# Patient Record
Sex: MALE | Race: WHITE | NOT HISPANIC OR LATINO | URBAN - METROPOLITAN AREA
[De-identification: names, ages, dates, MRNs, and addresses within clinical notes are randomized per-mention and may not be internally consistent; named-entity substitution may affect disease eponyms.]

---

## 2023-05-19 PROBLEM — M17.0 PRIMARY OSTEOARTHRITIS OF BOTH KNEES: Status: ACTIVE | Noted: 2023-05-19

## 2023-05-19 PROBLEM — E66.813 CLASS 3 SEVERE OBESITY WITH BODY MASS INDEX (BMI) OF 45.0 TO 49.9 IN ADULT: Status: ACTIVE | Noted: 2023-05-19

## 2023-05-19 PROBLEM — M1A.9XX0 CHRONIC GOUT INVOLVING TOE OF RIGHT FOOT WITHOUT TOPHUS: Status: ACTIVE | Noted: 2023-05-19

## 2023-05-19 PROBLEM — E66.01 CLASS 3 SEVERE OBESITY WITH BODY MASS INDEX (BMI) OF 45.0 TO 49.9 IN ADULT: Status: ACTIVE | Noted: 2023-05-19

## 2023-06-27 PROBLEM — U07.1 DISEASE DUE TO SEVERE ACUTE RESPIRATORY SYNDROME CORONAVIRUS 2 (SARS-COV-2): Status: ACTIVE | Noted: 2020-05-07

## 2024-10-21 ENCOUNTER — TELEPHONE (OUTPATIENT)
Dept: INFECTIOUS DISEASES | Facility: CLINIC | Age: 44
End: 2024-10-21
Payer: COMMERCIAL

## 2024-10-22 NOTE — TELEPHONE ENCOUNTER
----- Message from Jacqui sent at 10/21/2024  8:10 AM CDT -----  Contact: Pt Wife Jr Palacio  Type: Needs Medical Advice         Who Called: Pt Carrington Palacio     Best Call Back Number:[phone]     Additional Information: Requesting a call back regarding  Pt has ongoing infection in right leg. Pt was admitted in [facility]  09/26 and was in for 4 days. Pt was given IV antibiotics and and discharged. Pt then went to PCP on 10/01 and was given another round of antibiotics. Pt contacted pcp on 10/03 due to to it not getting better and was again given antibiotics and pt is currently taking but leg is not getting better and has scabbing w/ dark purple that fades and comes back. Pt wife is asking for the office to call her. Wife wanting pt to be seen by Dr Silver davis.     Please Advise- Thank you  
Per sang Conn to schedule 1-2 weeks  Called pt's wife, scheduled NP referral for cellulitis on 10/31/24 at 10 am.  
Returned call to pt's wife, discussed spouse's ongoing leg infection (see previous message), request to see Dr. Sheppard. Discussed referral process. Wife will call PCP Dr. Coker to have referral and records faxed asap for review by Dr. Sheppard.  
Patient

## 2024-10-31 ENCOUNTER — OFFICE VISIT (OUTPATIENT)
Dept: INFECTIOUS DISEASES | Facility: CLINIC | Age: 44
End: 2024-10-31
Payer: COMMERCIAL

## 2024-10-31 VITALS
RESPIRATION RATE: 14 BRPM | HEIGHT: 75 IN | BODY MASS INDEX: 35.8 KG/M2 | SYSTOLIC BLOOD PRESSURE: 115 MMHG | HEART RATE: 65 BPM | DIASTOLIC BLOOD PRESSURE: 66 MMHG | TEMPERATURE: 99 F | WEIGHT: 287.94 LBS

## 2024-10-31 DIAGNOSIS — L03.115 CELLULITIS OF RIGHT LOWER EXTREMITY: Primary | ICD-10-CM

## 2024-10-31 DIAGNOSIS — I77.6 VASCULITIS: ICD-10-CM

## 2024-10-31 PROCEDURE — 3078F DIAST BP <80 MM HG: CPT | Mod: CPTII,S$GLB,, | Performed by: INTERNAL MEDICINE

## 2024-10-31 PROCEDURE — 99203 OFFICE O/P NEW LOW 30 MIN: CPT | Mod: S$GLB,,, | Performed by: INTERNAL MEDICINE

## 2024-10-31 PROCEDURE — 3044F HG A1C LEVEL LT 7.0%: CPT | Mod: CPTII,S$GLB,, | Performed by: INTERNAL MEDICINE

## 2024-10-31 PROCEDURE — 1159F MED LIST DOCD IN RCRD: CPT | Mod: CPTII,S$GLB,, | Performed by: INTERNAL MEDICINE

## 2024-10-31 PROCEDURE — 3008F BODY MASS INDEX DOCD: CPT | Mod: CPTII,S$GLB,, | Performed by: INTERNAL MEDICINE

## 2024-10-31 PROCEDURE — 3074F SYST BP LT 130 MM HG: CPT | Mod: CPTII,S$GLB,, | Performed by: INTERNAL MEDICINE

## 2024-10-31 PROCEDURE — 99999 PR PBB SHADOW E&M-EST. PATIENT-LVL IV: CPT | Mod: PBBFAC,,, | Performed by: INTERNAL MEDICINE

## 2024-10-31 RX ORDER — LINEZOLID 600 MG/1
600 TABLET, FILM COATED ORAL EVERY 12 HOURS
Qty: 28 TABLET | Refills: 0 | Status: SHIPPED | OUTPATIENT
Start: 2024-10-31 | End: 2024-11-14

## 2024-10-31 NOTE — PROGRESS NOTES
Patient ID: Diony Medina is a 44 y.o. male.    Subjective:         Chief Complaint: Cellulitis (R leg )    HPI    5 weeks prior presented with malaise, feeling ill  Febrile, COVID? Negative. Fatigue  Next day leg redness   Seen in [facility] and received Vanc and Zosyn for 4 days then DC on Augmentin for 7 days  Unfortunately redness worsened on PO augmentin reason for which patient was referred to this clinic for evaluation  Denies fevers, chills or night sweats      Past Medical History:   Diagnosis Date    Acute idiopathic gout of right foot 02/26/2021    ADHD (attention deficit hyperactivity disorder), inattentive type 02/26/2021    Obesity 02/26/2021       No past surgical history on file.    Review of patient's allergies indicates:  No Known Allergies    No family history on file.    Social History     Socioeconomic History    Marital status:    Tobacco Use    Smoking status: Former     Types: Cigarettes    Smokeless tobacco: Never   Substance and Sexual Activity    Alcohol use: Yes     Comment: Socially    Drug use: Never    Sexual activity: Yes     Partners: Female       Current Outpatient Medications   Medication Instructions    amoxicillin-clavulanate 500-125mg (AUGMENTIN) 500-125 mg Tab 1 tablet, 2 times daily    colchicine (COLCRYS) 0.6 mg, Oral, 2 times daily PRN    gabapentin (NEURONTIN) 600 mg, Oral, 3 times daily    ibuprofen (ADVIL,MOTRIN) 800 MG tablet TAKE 1 TABLET BY MOUTH THREE TIMES DAILY AS NEEDED    linezolid (ZYVOX) 600 mg, Oral, Every 12 hours    MOUNJARO 5 mg, Every 7 days    traMADoL (ULTRAM) 50 mg, Oral, 3 times daily PRN         Review of Systems   Constitutional:  Negative for activity change, chills, diaphoresis, fatigue, fever and unexpected weight change.   HENT:  Negative for sore throat.    Eyes:  Negative for photophobia and visual disturbance.   Respiratory:  Negative for cough and shortness of breath.    Cardiovascular:  Negative for chest pain and leg swelling.    Gastrointestinal:  Negative for abdominal distention, abdominal pain, nausea and vomiting.   Genitourinary:  Negative for difficulty urinating, dysuria and flank pain.   Musculoskeletal:  Negative for arthralgias.   Skin:  Positive for color change. Negative for rash.   Allergic/Immunologic: Negative for immunocompromised state.   Neurological:  Negative for dizziness and headaches.   Psychiatric/Behavioral:  The patient is not nervous/anxious.            Objective:     Vitals:    10/31/24 0948   BP: 115/66   Pulse: 65   Resp: 14   Temp: 98.6 °F (37 °C)       Body mass index is 35.99 kg/m².         Physical Exam  Vitals reviewed.   Constitutional:       General: He is not in acute distress.     Appearance: Normal appearance. He is well-developed. He is not ill-appearing.   HENT:      Head: Normocephalic and atraumatic.      Right Ear: External ear normal.      Left Ear: External ear normal.      Nose: Nose normal.   Eyes:      General: No scleral icterus.        Right eye: No discharge.         Left eye: No discharge.      Pupils: Pupils are equal, round, and reactive to light.   Cardiovascular:      Rate and Rhythm: Normal rate and regular rhythm.      Heart sounds: Normal heart sounds. No murmur heard.  Pulmonary:      Effort: Pulmonary effort is normal. No respiratory distress.      Breath sounds: Normal breath sounds. No wheezing.   Abdominal:      General: There is no distension.      Palpations: Abdomen is soft.      Tenderness: There is no abdominal tenderness.   Musculoskeletal:         General: Normal range of motion.      Cervical back: Normal range of motion and neck supple. No rigidity.      Comments: Swelling, erythema   Lymphadenopathy:      Cervical: No cervical adenopathy.   Skin:     General: Skin is warm and dry.      Coloration: Skin is not jaundiced.   Neurological:      Mental Status: He is alert and oriented to person, place, and time.   Psychiatric:         Mood and Affect: Mood normal.          Speech: Speech normal.         Behavior: Behavior normal.         Judgment: Judgment normal.           Assessment:         Diony was seen today for cellulitis.    Diagnoses and all orders for this visit:    Cellulitis of right lower extremity  -     Ambulatory referral/consult to Infectious Disease  -     linezolid (ZYVOX) 600 mg Tab; Take 1 tablet (600 mg total) by mouth every 12 (twelve) hours. for 14 days    Vasculitis  -     Ambulatory referral/consult to Infectious Disease      Plan:     - Seems that he improved while on Vanc and Zosyn but worsened while on Augmentin  - Will start Linezolid  - patient does have evidence of lymphedema which is bilateral.  Recommended leg elevation and compression stockings.    - Also recommended lymphedema clinic.    - advised patient to communicate with this clinic if redness did not improve.  - all questions answered     Greater than 30 minutes was spent on this encounter, which included: review of recent encounters, review and interpretation of labs/images, obtaining pertinent history, performing a physical examination, counseling and educating the patient/family/caregiver, ordering medications/tests, documenting in the electronic health record, and coordinating care with necessary providers.    Reg Sheppard MD  Infectious Diseases

## 2024-11-13 ENCOUNTER — PATIENT MESSAGE (OUTPATIENT)
Dept: INFECTIOUS DISEASES | Facility: CLINIC | Age: 44
End: 2024-11-13
Payer: COMMERCIAL

## 2024-11-15 ENCOUNTER — TELEPHONE (OUTPATIENT)
Dept: INFECTIOUS DISEASES | Facility: CLINIC | Age: 44
End: 2024-11-15

## 2024-11-15 NOTE — TELEPHONE ENCOUNTER
Called pt's wife, informed 14 additional day of Linezolid, she has picked up and pt has continued. Will repeat cbc next week at [facility].    Pt has not been to Lymphedema Clinic, no referral.   Routed message to Dr. Sheppard

## 2024-11-22 ENCOUNTER — PATIENT MESSAGE (OUTPATIENT)
Dept: INFECTIOUS DISEASES | Facility: CLINIC | Age: 44
End: 2024-11-22
Payer: COMMERCIAL

## 2024-11-22 ENCOUNTER — TELEPHONE (OUTPATIENT)
Dept: INFECTIOUS DISEASES | Facility: CLINIC | Age: 44
End: 2024-11-22
Payer: COMMERCIAL

## 2024-11-22 NOTE — TELEPHONE ENCOUNTER
Call placed to patient, no answer.    Call placed to spouse   Unable to speak with her.    Noted mild thrombocytopenia and anemia likely related to linezolid.    It appears that patient is better so I have advised to stop linezolid and follow with lymphedema Clinic   All questions answered follow with Infectious Diseases as needed

## 2025-04-22 ENCOUNTER — OFFICE VISIT (OUTPATIENT)
Dept: FAMILY MEDICINE | Facility: CLINIC | Age: 45
End: 2025-04-22
Payer: COMMERCIAL

## 2025-04-22 VITALS
HEART RATE: 81 BPM | SYSTOLIC BLOOD PRESSURE: 110 MMHG | DIASTOLIC BLOOD PRESSURE: 76 MMHG | BODY MASS INDEX: 35.64 KG/M2 | OXYGEN SATURATION: 97 % | HEIGHT: 75 IN | WEIGHT: 286.63 LBS

## 2025-04-22 DIAGNOSIS — M1A.09X0 IDIOPATHIC CHRONIC GOUT OF MULTIPLE SITES WITHOUT TOPHUS: ICD-10-CM

## 2025-04-22 DIAGNOSIS — E66.09 CLASS 2 OBESITY DUE TO EXCESS CALORIES WITHOUT SERIOUS COMORBIDITY WITH BODY MASS INDEX (BMI) OF 35.0 TO 35.9 IN ADULT: Primary | ICD-10-CM

## 2025-04-22 DIAGNOSIS — E66.812 CLASS 2 OBESITY DUE TO EXCESS CALORIES WITHOUT SERIOUS COMORBIDITY WITH BODY MASS INDEX (BMI) OF 35.0 TO 35.9 IN ADULT: Primary | ICD-10-CM

## 2025-04-22 DIAGNOSIS — M17.0 PRIMARY OSTEOARTHRITIS OF BOTH KNEES: ICD-10-CM

## 2025-04-22 PROBLEM — U07.1 DISEASE DUE TO SEVERE ACUTE RESPIRATORY SYNDROME CORONAVIRUS 2 (SARS-COV-2): Status: RESOLVED | Noted: 2020-05-07 | Resolved: 2025-04-22

## 2025-04-22 PROCEDURE — 99999 PR PBB SHADOW E&M-EST. PATIENT-LVL III: CPT | Mod: PBBFAC,,, | Performed by: FAMILY MEDICINE

## 2025-04-22 RX ORDER — INDOMETHACIN 50 MG/1
CAPSULE ORAL
COMMUNITY
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
## 2025-04-29 ENCOUNTER — PATIENT MESSAGE (OUTPATIENT)
Dept: FAMILY MEDICINE | Facility: CLINIC | Age: 45
End: 2025-04-29
Payer: COMMERCIAL

## 2025-04-29 DIAGNOSIS — M1A.9XX0 CHRONIC GOUT INVOLVING TOE OF RIGHT FOOT WITHOUT TOPHUS, UNSPECIFIED CAUSE: ICD-10-CM

## 2025-04-30 NOTE — TELEPHONE ENCOUNTER
Care Due:                  Date            Visit Type   Department     Provider  --------------------------------------------------------------------------------                                NP -                              PRIMARY      [facility] FAMILY  Last Visit: 04-      CARE ([facility])   GILBERTO Bobo  Next Visit: None Scheduled  None         None Found                                                            Last  Test          Frequency    Reason                     Performed    Due Date  --------------------------------------------------------------------------------    HBA1C.......  6 months...  tirzepatide,.............  08- 02-    [facility] Embedded Care Due Messages. Reference number: [id number].   4/30/2025 9:10:41 AM CDT

## 2025-05-02 ENCOUNTER — TELEPHONE (OUTPATIENT)
Dept: FAMILY MEDICINE | Facility: CLINIC | Age: 45
End: 2025-05-02
Payer: COMMERCIAL

## 2025-05-02 RX ORDER — TRAMADOL HYDROCHLORIDE 50 MG/1
50 TABLET ORAL 3 TIMES DAILY PRN
Qty: 90 TABLET | Refills: 5 | Status: SHIPPED | OUTPATIENT
Start: 2025-05-02

## 2025-05-02 RX ORDER — GABAPENTIN 600 MG/1
600 TABLET ORAL 3 TIMES DAILY
Qty: 90 TABLET | Refills: 11 | Status: SHIPPED | OUTPATIENT
Start: 2025-05-02 | End: 2026-05-02

## 2025-05-02 RX ORDER — IBUPROFEN 800 MG/1
TABLET ORAL
Qty: 90 TABLET | Refills: 1 | Status: SHIPPED | OUTPATIENT
Start: 2025-05-02

## 2025-05-02 NOTE — TELEPHONE ENCOUNTER
----- Message from Maya sent at 5/2/2025 11:30 AM CDT -----  Contact: pt  Type:  Needs Medical AdviceWho Called: Nasra name and phone #:  [id number] - [place], [place] - [address] [facility] [address] [id number] [facility] [address] [address]: [phone] Fax: [phone] the patient rather a call back or a response via [facility]? Call back Best Call Back Number: .[phone] Information: tramadol HCl 50 mg Oral 3 times daily PRNProtocol Detailsibuprofen TAKE 1 TABLET BY MOUTH THREE TIMES DAILY AS NEEDEDProtocol Detailsgabapentin 600 mg Oral 3 times daily Pt just wants to know these medications are being filled by  and not dr sandhu that is no loger is

## 2025-05-02 NOTE — TELEPHONE ENCOUNTER
Patient is calling to clarify that you will be prescribing these mediations from now on. Please advise.

## 2025-05-04 ENCOUNTER — TELEPHONE (OUTPATIENT)
Dept: FAMILY MEDICINE | Facility: CLINIC | Age: 45
End: 2025-05-04
Payer: COMMERCIAL

## 2025-05-04 DIAGNOSIS — M1A.9XX0 CHRONIC GOUT INVOLVING TOE OF RIGHT FOOT WITHOUT TOPHUS, UNSPECIFIED CAUSE: ICD-10-CM

## 2025-05-05 NOTE — TELEPHONE ENCOUNTER
Copied from CRM #[id number]. Topic: Medications - Medication Refill  >> May 1, 2025  8:18 AM Meron wrote:  Type:  RX Refill Request    Who Called:  Pt/wife  Refill or New Rx: refill  RX Name and Strength: Start End   gabapentin (NEURONTIN) 600 MG tablet 90 tablet 11 4/11/2024 4/22/2025   Sig - Route: Take 1 tablet (600 mg total) by mouth 3 (three) times daily. - Oral   Sent to pharmacy as: gabapentin (NEURONTIN) 600 MG tablet    Disp Refills Start End   traMADoL (ULTRAM) 50 mg tablet 90 tablet 5 7/31/2024 -   Sig - Route: Take 1 tablet (50 mg total) by mouth 3 (three) times daily as needed for Pain. - Oral   Sent to pharmacy as: traMADoL (ULTRAM) 50 mg tablet      Disp Refills Start End   ibuprofen (ADVIL,MOTRIN) 800 MG tablet 90 tablet 1 3/22/2024 -   Sig: TAKE 1 TABLET BY MOUTH THREE TIMES DAILY AS NEEDED   Sent to pharmacy as: ibuprofen (ADVIL,MOTRIN) 800 MG tablet     How is the patient currently taking it? (ex. 1XDay):see above  Is this a 30 day or 90 day RX:se above  Preferred Pharmacy with phone number:  [id number] - [place] [id number] [place] [address] [address] [facility] [address] [facility] [address] [address]  Phone: [phone] Fax: [phone]  Local or Mail Order:local  Ordering Provider:  Would the patient rather a call back or a response via [facility]? call  Best Call Back Number:[phone]    Additional Information:

## 2025-07-03 DIAGNOSIS — M1A.9XX0 CHRONIC GOUT INVOLVING TOE OF RIGHT FOOT WITHOUT TOPHUS, UNSPECIFIED CAUSE: ICD-10-CM

## 2025-07-03 RX ORDER — IBUPROFEN 800 MG/1
800 TABLET, FILM COATED ORAL
Qty: 90 TABLET | Refills: 1 | Status: SHIPPED | OUTPATIENT
Start: 2025-07-03

## 2025-07-03 NOTE — TELEPHONE ENCOUNTER
Care Due:                  Date            Visit Type   Department     Provider  --------------------------------------------------------------------------------                                NP -                              PRIMARY      [facility] FAMILY  Last Visit: 04-      CARE ([facility])   GILBERTO Bobo  Next Visit: None Scheduled  None         None Found                                                            Last  Test          Frequency    Reason                     Performed    Due Date  --------------------------------------------------------------------------------    HBA1C.......  6 months...  tirzepatide,.............  08- 02-    [facility] Embedded Care Due Messages. Reference number: [id number].   7/03/2025 4:02:27 AM CDT